# Patient Record
(demographics unavailable — no encounter records)

---

## 2024-11-06 NOTE — ASSESSMENT
[Patient Optimized for Surgery] : Patient optimized for surgery [FreeTextEntry4] : This patient, 79F with PMHx of HTN, HLD presenting for evaluation prior to cataract surgery on 11/12/24 with Dr. Hernadez. Medications: Continue medications perioperatively. Labwork: CBC, CMP already obtained and reviewed.  Cesar 0.0% TAMIKO Risk of MI or cardiac arrest intraoperatively or up to 30 days post-op. RCRI 0 points Class 1 Risk 3.9% 30-day risk of death, MI, or cardiac arrest. Patient is low-risk for low-risk procedure.

## 2024-11-06 NOTE — HISTORY OF PRESENT ILLNESS
Patient called and given his lab results and Zuñiga recommendations. He agrees to repeat labs in 3 months. RN placed repeat lab orders. Patient verbalized understanding and has no further questions at this time.    [No Pertinent Cardiac History] : no history of aortic stenosis, atrial fibrillation, coronary artery disease, recent myocardial infarction, or implantable device/pacemaker [No Pertinent Pulmonary History] : no history of asthma, COPD, sleep apnea, or smoking [No Adverse Anesthesia Reaction] : no adverse anesthesia reaction in self or family member [Chronic Anticoagulation] : no chronic anticoagulation [Chronic Kidney Disease] : no chronic kidney disease [Diabetes] : no diabetes [(Patient denies any chest pain, claudication, dyspnea on exertion, orthopnea, palpitations or syncope)] : Patient denies any chest pain, claudication, dyspnea on exertion, orthopnea, palpitations or syncope [Moderate (4-6 METs)] : Moderate (4-6 METs) [FreeTextEntry1] : L cataract surgery [FreeTextEntry2] : 11/12/24 [FreeTextEntry3] : Dr Hernadez [FreeTextEntry4] : This is a 79F with PMHx of HTN, HLD presenting for evaluation prior to cataract surgery. Denies any ROS today. Denies any history of prior reaction to anesthesia in self or family. Denies any history of blood clots or bleeding disorders in self or family. Denies any CP/SOB on exertion and is able to walk up 2 flights of stairs without issue.

## 2025-01-10 NOTE — HISTORY OF PRESENT ILLNESS
[Former] : former [>= 20 pack years] : >= 20 pack years [TextBox_4] : 79 year old former smoker at 1 to 2 pack per day for about 40 years quit 20 years ago presents for evaluation of shortness of breath. She states that she has progressively noticed the shortness of breath over the last year. The cough has started about 3 months ago with a throat sensation of mucous and nasal clearing. She states that she had a viral illness in October which started the cough and has improved but persisted the mucous sensation despite that. She quit smoking over 20 years ago but now notices that she struggles to walk as far as previous. She did have smoke exposure in her job but no family hx of lung disease.  1/9/2024 Patient presenting for follow up after trying spiriva inhaler and has not noticed a large amount of difference. She has still noticed a progressive shortness of breath and confirmed that she did smoke a pack a day for about 40 years. Cough has improved with the nasal sprays but still does feel that it is still there. [TextBox_11] : 1 [TextBox_13] : 40 [YearQuit] : 2012 [ESS] : 0

## 2025-01-10 NOTE — ASSESSMENT
[FreeTextEntry1] : 79 year old former smoker presenting for evaluation of shortness of breath and cough   Data reviewed: CT abdomen 2/28/2023- Cyst present in para septal area otherwise normal lung base CBC 9/2024- 80 eosinophils   Shortness of breath Cough LPR Former smoker  Patient does have risk factors for COPD and symptoms that would fit. She had trouble using the respimet inhaler so given trelegy samples to see if it is easier for her and she gets a benefit. Will obtain lung cancer screening CT given hx of smoking and only quit 12 years ago. Cough improved but still present and favor that she has a LPR component will send to ENT for laryngoscopy to confirm.  - trelegy samples for expected COPD - Concern for LPR adding to cough will send to ENT for laryngoscopy - Lung cancer screening CT given smoking hx  RTC in 6-8 weeks

## 2025-02-14 NOTE — ASSESSMENT
[FreeTextEntry1] : 79 year old former smoker presenting for evaluation of shortness of breath and cough   Data reviewed: CT abdomen 2/28/2023- Cyst present in para septal area otherwise normal lung base CBC 9/2024- 80 eosinophils CT chest 1/22/2025- Normal appearing lung parenchyma.   Shortness of breath Cough LPR Former smoker  Patient trialed inhaler with minimal improvement and CT chest with no changes in the lung to suggest pulmonary cause. Favor that cause of shortness of breath is related to deconditioning vs obesity but did discuss CPET which patient would like to pursue.  - trelegy samples for possible COPD without improvement - Concern for LPR adding to cough will send to ENT for laryngoscopy - CT chest negative - Will plan for CPET, favor deconditioning vs obesity cause of symptoms.  RTC after CPET

## 2025-02-14 NOTE — HISTORY OF PRESENT ILLNESS
[Former] : former [>= 20 pack years] : >= 20 pack years [TextBox_4] : 79 year old former smoker at 1 to 2 pack per day for about 40 years quit 20 years ago presents for evaluation of shortness of breath. She states that she has progressively noticed the shortness of breath over the last year. The cough has started about 3 months ago with a throat sensation of mucous and nasal clearing. She states that she had a viral illness in October which started the cough and has improved but persisted the mucous sensation despite that. She quit smoking over 20 years ago but now notices that she struggles to walk as far as previous. She did have smoke exposure in her job but no family hx of lung disease.  1/9/2024 Patient presenting for follow up after trying spiriva inhaler and has not noticed a large amount of difference. She has still noticed a progressive shortness of breath and confirmed that she did smoke a pack a day for about 40 years. Cough has improved with the nasal sprays but still does feel that it is still there.  2/13/2025 Patient did trial trelegy inhaler with some feeling of improvement in the chest but feels that shortness of breath is unchanged. Did have CT scan of the chest done for lung cancer screening. [TextBox_11] : 1 [TextBox_13] : 40 [YearQuit] : 2012 [ESS] : 0

## 2025-02-19 NOTE — ASSESSMENT
[FreeTextEntry1] : She has a history of chronic rhinitis with postnasal drip, intermittent hoarseness, and throat clearing.  She does have an upper respiratory tract infection today.  The throat culture was sent to rule out bacterial infection.  She has findings consistent with reflux.  Reflux may be contributing to her symptoms.  It is unclear if it is contributing to the shortness of breath while walking.  That may be more consistent with cardiac or pulmonary disease.  I did not see obvious paradoxical vocal cord motion  Plan -Findings and management options were discussed with the patient. - Nasal saline irrigations as needed - Nasal steroid spray - she was given literature regarding postnasal drip - Reflux precautions recommended.  She was given literature - Trial of Pepcid - Consider GI evaluation and further workup for reflux - Consider allergy evaluation - She was asked to call for the culture results.  If it is positive, I will place her on antibiotics - I have asked her to consider speech therapy evaluation to rule out paradoxical vocal cord motion and to see if there are techniques to help with the cough - Follow-up in 3 weeks - Call and return earlier if any problems or worsening symptoms

## 2025-02-19 NOTE — REVIEW OF SYSTEMS
Completed, [facility] OF THE [facility] fax bin [Patient Intake Form Reviewed] : Patient intake form was reviewed

## 2025-02-19 NOTE — HISTORY OF PRESENT ILLNESS
[de-identified] : ADI YUNG is a 79 year old patient Referred for ENT evaluation for trouble breathing.  She said that when she walks, she feels out of breath.  She has seen a pulmonologist and has been using an inhaler.  She said she also underwent cardiac evaluation.  Her symptoms have been present for approximately 1 year.  She does have a chronic postnasal drip with intermittent hoarseness and throat clearing.  She does not report significant nasal obstruction or congestion. She has no throat pain or dysphagia.  She may have a mild URI now.  She denies a history of allergies She does not vape.  She is a former smoker She denies a known history of reflux

## 2025-02-19 NOTE — PHYSICAL EXAM
[TextEntry] : PHYSICAL EXAM  General: The patient was alert, oriented and in no distress. Voice was clear.  Face: The patient had no facial asymmetry or mass. The skin was unremarkable.  Ears: Hearing normal to conversational voice External ears were normal without deformity. External ear canals: AD- normal. no cerumen or inflammation. AS- cerumen impaction Tympanic membranes: AD- intact. no perforation or effusion  PROCEDURE- EAR MICROSCOPY AND CERUMEN REMOVAL from left ear  Indication: cerumen removal Under the microscope, obstructing cerumen was removed atraumatically from the left ear with a suction, curette and/or forceps.  Canal: normal. no inflammation.  Tympanic membrane: Intact. no perforation or effusion.  Nose:  The external nose had no significant deformity. On anterior rhinoscopy, the nasal mucosa was clear.  The anterior septum was grossly midline. There were no visualized polyps, purulence  or masses.   Oral cavity: Oral mucosa- normal. Oral and base of tongue- clear and without mass. Gingival and buccal mucosa- moist and without lesions. Palate- the palate moved well. There was no cleft palate. There appeared to be good salivary flow.   Oral cavity/oropharynx- no pus, erythema or mass  Neck:  The neck was symmetrical. The parotid and submandibular glands were normal without masses. The trachea was midline and there was no unusual crepitus. Thyroid was smooth and nontender and no masses were palpated. No masses  Lymphatics: Cervical adenopathy- none.    PROCEDURE:  FLEXIBLE LARYNGOSCOPY, NASAL ENDOSCOPY   Surgeon: Dr. Chaney Indication: Chronic rhinitis, postnasal drip, cough, shortness of breath, inadequate exam on anterior anoscopy Anesthetic: Topical lidocaine and Afrin Procedure: The patient was placed in a sitting position.  Following application of the topical anesthetic and decongestant, exam was performed with a flexible telescope.  The scope was passed along the right nasal floor to the nasopharynx.  It was then passed into the region of the middle meatus, middle turbinate, and sphenoethmoid region.  An identical procedure was performed on the left side.  The following findings were noted:  Nasal mucosa: Edematous Septum: Deviated  There was clear mucus throughout the nasal cavities Right nasal cavity      Inferior turbinate: Hypertrophic      Middle turbinate: normal      Superior turbinate: normal      Inferior meatus: no pus, polyps or congestion      Middle meatus:  no pus, polyps or congestion       Superior meatus:  no pus, polyps, or congestion      Sphenoethmoidal recess: no pus, polyps or congestion   Left nasal cavity      Inferior turbinate: Hypertrophic      Middle turbinate: normal      Superior turbinate: normal      Inferior meatus: no pus, polyps or congestion      Middle meatus: no pus, polyps, or congestion      Superior meatus:  no pus, polyps, or congestion      Sphenoethmoidal recess: no pus, polyps or congestion   Nasopharynx: no masses, choanae patent, no adenoid tissue  Base of tongue and vallecula: no masses or asymmetry Posterior pharyngeal wall: no masses.  Hypopharynx: symmetrical. No masses Pyriform sinuses: no lesions or pooling of secretions Epiglottis: normal. No edema or lesions Aryepiglottic folds: normal. No lesions.  True vocal cords: clear and mobile. No lesions. Airway patent.  No obvious vocal cord paradoxical motion False vocal cords: normal Ventricles: no masses.  Arytenoids: Mild edema Interarytenoid area: no masses.  Mild edema Subglottis: normal. no masses

## 2025-03-12 NOTE — ASSESSMENT
[FreeTextEntry1] : HEALTH CARE MAINTENANCE - Influenza vaccine: Discuss at [facility] - Covid vaccine: Discuss at [facility] - Pneumococcal: UTD 2017 - DEXA: 2017 osteopenia - Shingrix: UTD 2014 - TDAP: UTD 2019 - RSV: Discuss at [facility] - Colonoscopy: Discuss at [facility] - Mammogram: UTD Aged out  RTC 1 week for repeat BP check 5 minutes spent administering an evidence-based ASCVD Risk Assessment tool, discussing lifestyle modifications, and treatment options. Lipid profile data used from this encounter.

## 2025-03-12 NOTE — HEALTH RISK ASSESSMENT
[Very Good] : ~his/her~  mood as very good [Monthly or less (1 pt)] : Monthly or less (1 point) [1 or 2 (0 pts)] : 1 or 2 (0 points) [Never (0 pts)] : Never (0 points) [No falls in past year] : Patient reported no falls in the past year [0] : 2) Feeling down, depressed, or hopeless: Not at all (0) [PHQ-2 Negative - No further assessment needed] : PHQ-2 Negative - No further assessment needed [Yes] : Reviewed medication list for presence of high-risk medications. [Never] : Never [Patient reported mammogram was normal] : Patient reported mammogram was normal [HIV test declined] : HIV test declined [Hepatitis C test declined] : Hepatitis C test declined [With Significant Other] : lives with significant other [Retired] : retired [] :  [Feels Safe at Home] : Feels safe at home [Fully functional (bathing, dressing, toileting, transferring, walking, feeding)] : Fully functional (bathing, dressing, toileting, transferring, walking, feeding) [Fully functional (using the telephone, shopping, preparing meals, housekeeping, doing laundry, using] : Fully functional and needs no help or supervision to perform IADLs (using the telephone, shopping, preparing meals, housekeeping, doing laundry, using transportation, managing medications and managing finances) [Seat Belt] :  uses seat belt [[id number]] : 0 [Reports changes in hearing] : Reports no changes in hearing [Reports changes in vision] : Reports no changes in vision [Reports changes in dental health] : Reports no changes in dental health [MammogramComments] : Aged out

## 2025-03-12 NOTE — HISTORY OF PRESENT ILLNESS
[FreeTextEntry1] : KORTNEY [de-identified] : ADI YUNG is a 79 year F with PMHx of HTN, HLD presenting for CPE. She had fried food last night which may be contributing to her high BP today and acid reflux. She also see pulmonology and ENT recently for c/f cough, LPR, and small hiatal hernia, and has been adherent to nasal spray and PPI BID which has not helped as much. She had CT chest which was negative for any significant pulm findings, exercise stress test pending results.  Diet/exercise: Walks a lot. Normally cooks food or aims to eat healthy and avoid salt, last night had fried foods for the first time in awhile. Social history: Drinks wine 2-3 glasses/every 2 weeks. No tobacco or drug use currently. Live with .

## 2025-03-19 NOTE — PHYSICAL EXAM
[TextEntry] : PHYSICAL EXAM  General: The patient was alert, oriented and in no distress. Voice was clear.  Face: The patient had no facial asymmetry or mass. The skin was unremarkable.  Ears: Hearing normal to conversational voice External ears were normal without deformity. Ear canals were clear. No cerumen or inflammation Tympanic membranes were intact and normal. No perforation or effusion. mobile  Nose:  The external nose had no significant deformity.  . On anterior rhinoscopy, the nasal mucosa was clear.  The anterior septum was grossly midline. There were no visualized polyps, purulence  or masses.   Oral cavity: Oral mucosa- normal. Oral and base of tongue- clear and without mass. Gingival and buccal mucosa- moist and without lesions. Palate- the palate moved well. There was no cleft palate. There appeared to be good salivary flow.   Oral cavity/oropharynx- no pus, erythema or mass  Neck:  The neck was symmetrical. The parotid and submandibular glands were normal without masses. The trachea was midline and there was no unusual crepitus. Thyroid was smooth and nontender and no masses were palpated. No masses  Lymphatics: Cervical adenopathy- none.

## 2025-03-19 NOTE — ASSESSMENT
[FreeTextEntry1] : She may have some improvement in the throat clearing and intermittent hoarseness with treatment for reflux and nasal steroid spray.  She had a cold at her last visit which resolved.  The culture was negative.  She continues to have a postnasal drip.  Plan -Findings and management options were discussed with the patient. - Nasal saline irrigations as needed - Nasal steroid spray - Continue reflux precautions and Pepcid.  She could consider trying a PPI for a couple of weeks - Consider GI evaluation for further workup of reflux - Allergy evaluation - Cardiology evaluation pending - We discussed possible speech pathology evaluation to rule out paradoxical vocal cord motion.  There is no obvious paradoxical vocal cord motion on flexible laryngoscopy at her last visit.  She may be improving with the current treatment.  She is going to go for allergy evaluation first - Follow-up after allergy evaluation - Call and return earlier if any problems or worsening symptoms

## 2025-03-19 NOTE — HISTORY OF PRESENT ILLNESS
[de-identified] : ADI YUNG is a 80 year old patient Here for follow-up for throat clearing, postnasal drip, and intermittent hoarseness.  She said that the pressure in her chest has improved.  She been using a nasal steroid spray and Pepcid.  The throat clearing might be somewhat better.  She is not having a cough.  She still has postnasal drip and occasional mild hoarseness.  ENT history She has seen a pulmonologist and cardiac evaluation is pending She denies a history of allergies She does not vape. She is a former smoker  NE/FL-deviated septum, inferior turbinate hypertrophy, mild laryngeal changes consistent with reflux.  No obvious paradoxical vocal cord motion

## 2025-04-10 NOTE — HISTORY OF PRESENT ILLNESS
[Home] : at home, [unfilled] , at the time of the visit. [Medical Office: ([facility])___] : at the medical office located in  [Telephone (audio)] : This telephonic visit was provided via audio only technology. [No access to tele-video equipment] : patient lacks access to tele-video equipment. [Verbal consent obtained from patient] : the patient, [unfilled] [Former] : former [>= 20 pack years] : >= 20 pack years [TextBox_4] : 79-year-old former smoker at 1 to 2 pack per day for about 40 years quit 20 years ago presents for evaluation of shortness of breath. She states that she has progressively noticed the shortness of breath over the last year. The cough has started about 3 months ago with a throat sensation of mucous and nasal clearing. She states that she had a viral illness in October which started the cough and has improved but persisted the mucous sensation despite that. She quit smoking over 20 years ago but now notices that she struggles to walk as far as previous. She did have smoke exposure in her job but no family hx of lung disease.  1/9/2024 Patient presenting for follow up after trying spiriva inhaler and has not noticed a large amount of difference. She has still noticed a progressive shortness of breath and confirmed that she did smoke a pack a day for about 40 years. Cough has improved with the nasal sprays but still does feel that it is still there.  2/13/2025 Patient did trial trelegy inhaler with some feeling of improvement in the chest but feels that shortness of breath is unchanged. Did have CT scan of the chest done for lung cancer screening.  04/10/25 Patient presenting for follow up states that she feels about the same as compared to previous. Was able to do CPET and presenting for results. [TextBox_11] : 1 [TextBox_13] : 40 [YearQuit] : 2012 [ESS] : 0

## 2025-04-10 NOTE — ASSESSMENT
[FreeTextEntry1] : 79 year old former smoker presenting for evaluation of shortness of breath and cough   Data reviewed: CT abdomen 2/28/2023- Cyst present in para septal area otherwise normal lung base CBC 9/2024- 80 eosinophils CT chest 1/22/2025- Normal appearing lung parenchyma. CPET 03/2025- Normal   Shortness of breath Cough LPR Former smoker  CPET and CT imaging reviewed with no pulmonary cause of her symptoms. Adequate lung reserve and did notice some ectopy with exercise and is planning to see cardiology. Spirometry also with no fixed obstruction present. Given this do not see any pulmonary cause of her symptoms.  - Trelegy samples for possible COPD without improvement - Concern for LPR adding to cough will send to ENT for laryngoscopy - CT chest negative - Negative CPET, favor deconditioning vs obesity cause of symptoms.

## 2025-04-10 NOTE — REASON FOR VISIT
[Hyperlipidemia] : hyperlipidemia [Hypertension] : hypertension [Other: ____] : [unfilled] [FreeTextEntry1] :   CV Data: ECG 4/10/25: sinus colette, 1st deg AVB

## 2025-04-10 NOTE — HISTORY OF PRESENT ILLNESS
[FreeTextEntry1] : 80F w HTN, HLD, who is establishing care. Used to follow w a cardiologist at [facility] with a negative work up 5 years ago.   4/10/25 NEW: + SHABAZZ that has been worsening recently. Started years ago. Most she can do is walk < 2 blocks. Used to walk miles. CPET 3/6/25 showed low pVO2 66% pred. BR was 48%, low HR achieved at 66% MPHR, spirometry largely normal. Ectopy with pauses noted in recovery.  FH: no early CAD; F - Ca at 67; M - lived to 88, HTN, DMT2 SH: former smoker, stopped 24 yrs ago

## 2025-04-10 NOTE — HISTORY OF PRESENT ILLNESS
[FreeTextEntry1] : 80F w HTN, HLD, who is establishing care. Used to follow w a cardiologist at [facility] with a negative work up 5 years ago.   4/10/25 NEW: + SHABAZZ that has been worsening recently. Started years ago. Most she can do is walk < 2 blocks. Used to walk miles. CPET 3/6/25 showed low pVO2 66% pred. BR was 48%, low HR achieved at 66% MPHR, spirometry largely normal. Ectopy with pauses noted in recovery.  FH: no early CAD; F - Ca at 67; M - lived to 88, HTN, DMT2 SH: former smoker, stopped 24 yrs ago

## 2025-04-10 NOTE — REASON FOR VISIT
Immunizations Administered       Name Date Dose Route    Pneumococcal, PCV20, PREVNAR 20, (age 6w+), IM, 0.5mL 1/7/2025 0.5 mL Intramuscular    Site: Deltoid- Right    Lot: [id number]    NDC: 9429-6443-31            [Follow-Up] : a follow-up visit [Cough] : cough [Shortness of Breath] : shortness of breath

## 2025-05-15 NOTE — ASSESSMENT
[FreeTextEntry1] : #Stasis Dermatitis - 7 days ago it started acutely - red and swollen b/l lower legs with 2+ pitting edema - symptoms worse at end of day - not on a diuretic - not wearing compression stockings - Plan: Rx TMC 0.1% oint BID - compression stockings 20-30mmHg daily   RTC 3 weeks

## 2025-05-15 NOTE — HISTORY OF PRESENT ILLNESS
[FreeTextEntry1] : NPV- Rash on legs [de-identified] : Nayely Cisneros 79 y/o F presents with rash on legs.  -Patient reports swelling in the area.  -Patient reports itchiness and reports using cephalexin 500mg

## 2025-06-23 NOTE — HISTORY OF PRESENT ILLNESS
[FreeTextEntry1] : follow up [de-identified] : ADI YUNG is a 79 year F with PMHx of HTN, HLD presenting for f/u of venous stasis dermatitis.   6/23/2025 S/p lasix x7 days with significant improvement in b/l LE edema. We discussed adjusting dosage of lasix to M/W/F or as needed based on swelling. BP remains uncontrolled despite addition of lasix, but patient is under a lot of stress currently as her  is undergoing knee replacement tomorrow. Due to this we deferred adjustment of her BP meds per her request. She is set to f/u with cardiology in 2 weeks.  6/17/2025 She has had longstanding bilateral LE edema, now improved after keflex for cellulitis. She bought new compression stockings.